# Patient Record
Sex: MALE | Race: BLACK OR AFRICAN AMERICAN | HISPANIC OR LATINO | URBAN - METROPOLITAN AREA
[De-identification: names, ages, dates, MRNs, and addresses within clinical notes are randomized per-mention and may not be internally consistent; named-entity substitution may affect disease eponyms.]

---

## 2023-06-19 ENCOUNTER — HOSPITAL ENCOUNTER (EMERGENCY)
Facility: HOSPITAL | Age: 28
Discharge: HOME | End: 2023-06-19
Attending: INTERNAL MEDICINE

## 2023-06-19 ENCOUNTER — APPOINTMENT (EMERGENCY)
Dept: RADIOLOGY | Facility: HOSPITAL | Age: 28
End: 2023-06-19

## 2023-06-19 VITALS
HEART RATE: 90 BPM | RESPIRATION RATE: 16 BRPM | SYSTOLIC BLOOD PRESSURE: 121 MMHG | DIASTOLIC BLOOD PRESSURE: 79 MMHG | TEMPERATURE: 98.6 F | OXYGEN SATURATION: 99 % | WEIGHT: 113 LBS

## 2023-06-19 DIAGNOSIS — R68.89 FLU-LIKE SYMPTOMS: Primary | ICD-10-CM

## 2023-06-19 LAB
S PYO DNA THROAT QL NAA+PROBE: NOT DETECTED
SARS-COV-2 RNA RESP QL NAA+PROBE: NEGATIVE

## 2023-06-19 PROCEDURE — 87635 SARS-COV-2 COVID-19 AMP PRB: CPT | Performed by: PHYSICIAN ASSISTANT

## 2023-06-19 PROCEDURE — 71045 X-RAY EXAM CHEST 1 VIEW: CPT

## 2023-06-19 PROCEDURE — 87651 STREP A DNA AMP PROBE: CPT | Performed by: PHYSICIAN ASSISTANT

## 2023-06-19 PROCEDURE — 99283 EMERGENCY DEPT VISIT LOW MDM: CPT

## 2023-06-19 RX ORDER — IBUPROFEN 400 MG/1
400 TABLET ORAL ONCE
Status: COMPLETED | OUTPATIENT
Start: 2023-06-19 | End: 2023-06-19

## 2023-06-19 RX ORDER — DEXTROMETHORPHAN HYDROBROMIDE AND PROMETHAZINE HYDROCHLORIDE 15; 6.25 MG/5ML; MG/5ML
5 SOLUTION ORAL 4 TIMES DAILY PRN
Qty: 118 ML | Refills: 0 | Status: SHIPPED | OUTPATIENT
Start: 2023-06-19 | End: 2023-06-26

## 2023-06-19 RX ADMIN — IBUPROFEN 400 MG: 400 TABLET, FILM COATED ORAL at 15:11

## 2023-06-19 NOTE — ED PROVIDER NOTES
"History  Chief Complaint   Patient presents with   • Flu Symptoms     Reports cough, congestion and overall \"not feeling well\" since yesterday  This is a 28-year-old male patient who presents with a cough congestion body aches not feeling well  States that yesterday when it started with a dry cough he is now making mucus  He has no shortness of breath or chest pain  Denies any fever or chills  Has a generalized headache no focal findings no blurred vision double vision no photophobia no stiff neck does have a sore throat when he coughs no nasal congestion no nausea vomiting diarrhea abdominal pain no urinary symptoms  Nothing makes it better or worse he has tried nothing over-the-counter differential diagnosis includes not limited to COVID, flu, pneumonia, viral syndrome          None       History reviewed  No pertinent past medical history  History reviewed  No pertinent surgical history  History reviewed  No pertinent family history  I have reviewed and agree with the history as documented  E-Cigarette/Vaping     E-Cigarette/Vaping Substances     Social History     Tobacco Use   • Smoking status: Never   • Smokeless tobacco: Never   Substance Use Topics   • Alcohol use: Not Currently   • Drug use: Yes     Types: Marijuana       Review of Systems   Constitutional: Negative for chills, diaphoresis, fatigue and fever  HENT: Positive for sore throat  Negative for congestion, ear pain, nosebleeds and voice change  Eyes: Negative for photophobia, pain, discharge and visual disturbance  Respiratory: Positive for cough  Negative for choking, chest tightness, shortness of breath and wheezing  Cardiovascular: Negative for chest pain and palpitations  Gastrointestinal: Negative for abdominal distention, abdominal pain, diarrhea and vomiting  Genitourinary: Negative for dysuria, flank pain and frequency  Musculoskeletal: Positive for myalgias   Negative for back pain, gait problem and joint " swelling  Skin: Negative for color change and rash  Neurological: Positive for headaches  Negative for dizziness and syncope  Psychiatric/Behavioral: Negative for behavioral problems and confusion  The patient is not nervous/anxious  All other systems reviewed and are negative  Physical Exam  Physical Exam  Vitals and nursing note reviewed  Constitutional:       General: He is not in acute distress  Appearance: Normal appearance  He is well-developed  He is not ill-appearing, toxic-appearing or diaphoretic  HENT:      Head: Normocephalic and atraumatic  Right Ear: Tympanic membrane, ear canal and external ear normal       Left Ear: Tympanic membrane, ear canal and external ear normal       Nose: Nose normal       Mouth/Throat:      Mouth: Mucous membranes are moist    Eyes:      Extraocular Movements: Extraocular movements intact  Conjunctiva/sclera: Conjunctivae normal       Pupils: Pupils are equal, round, and reactive to light  Cardiovascular:      Rate and Rhythm: Normal rate and regular rhythm  Heart sounds: No murmur heard  Pulmonary:      Effort: Pulmonary effort is normal  No respiratory distress  Breath sounds: Normal breath sounds  Abdominal:      Palpations: Abdomen is soft  Tenderness: There is no abdominal tenderness  Musculoskeletal:         General: No swelling  Cervical back: Neck supple  Skin:     General: Skin is warm and dry  Capillary Refill: Capillary refill takes less than 2 seconds  Neurological:      General: No focal deficit present  Mental Status: He is alert and oriented to person, place, and time     Psychiatric:         Mood and Affect: Mood normal          Vital Signs  ED Triage Vitals   Temperature Pulse Respirations Blood Pressure SpO2   06/19/23 1447 06/19/23 1446 06/19/23 1446 06/19/23 1446 06/19/23 1446   98 6 °F (37 °C) 90 16 121/79 99 %      Temp Source Heart Rate Source Patient Position - Orthostatic VS BP Location FiO2 (%)   06/19/23 1447 -- 06/19/23 1446 06/19/23 1446 --   Oral  Lying Right arm       Pain Score       06/19/23 1450       7           Vitals:    06/19/23 1446   BP: 121/79   Pulse: 90   Patient Position - Orthostatic VS: Lying         Visual Acuity      ED Medications  Medications   ibuprofen (MOTRIN) tablet 400 mg (400 mg Oral Given 6/19/23 1511)       Diagnostic Studies  Results Reviewed     Procedure Component Value Units Date/Time    COVID only [[id number]]  (Normal) Collected: 06/19/23 1513    Lab Status: Final result Specimen: Nares from Nose Updated: 06/19/23 1556     SARS-CoV-2 Negative    Narrative:      FOR PEDIATRIC PATIENTS - copy/paste COVID Guidelines URL to browser: [email or web address]  [employer]    SARS-CoV-2 assay is a Nucleic Acid Amplification assay intended for the  qualitative detection of nucleic acid from SARS-CoV-2 in nasopharyngeal  swabs  Results are for the presumptive identification of SARS-CoV-2 RNA  Positive results are indicative of infection with SARS-CoV-2, the virus  causing COVID-19, but do not rule out bacterial infection or co-infection  with other viruses  Laboratories within the [place] and its  territories are required to report all positive results to the appropriate  public health authorities  Negative results do not preclude SARS-CoV-2  infection and should not be used as the sole basis for treatment or other  patient management decisions  Negative results must be combined with  clinical observations, patient history, and epidemiological information  This test has not been FDA cleared or approved  This test has been authorized by FDA under an Emergency Use Authorization  (EUA)   This test is only authorized for the duration of time the  declaration that circumstances exist justifying the authorization of the  emergency use of an in vitro diagnostic tests for detection of SARS-CoV-2  virus and/or diagnosis of COVID-19 infection under section 564(b)(1) of  the Act, 21 U  S C  140JMB-8(S)(6), unless the authorization is terminated  or revoked sooner  The test has been validated but independent review by FDA  and CLIA is pending  Test performed using [employer] GeneXpert: This RT-PCR assay targets N2,  a region unique to SARS-CoV-2  A conserved region in the E-gene was chosen  for pan-Sarbecovirus detection which includes SARS-CoV-2  According to CMS-2020-01-R, this platform meets the definition of [email or web address]-[email or web address] technology  Strep A PCR [[id number]]  (Normal) Collected: 06/19/23 1513    Lab Status: Final result Specimen: Throat Updated: 06/19/23 1549     STREP A PCR Not Detected                 XR chest 1 view portable   ED Interpretation by Heriberto Spring PA-C (06/19 1520)   nad                 Procedures  Procedures         ED Course                               SBIRT 20yo+    Flowsheet Row Most Recent Value   Initial Alcohol Screen: US AUDIT-C     1  How often do you have a drink containing alcohol? 0 Filed at: 06/19/2023 1447   2  How many drinks containing alcohol do you have on a typical day you are drinking? 0 Filed at: 06/19/2023 1447   3a  Male UNDER 65: How often do you have five or more drinks on one occasion? 0 Filed at: 06/19/2023 1447   3b  FEMALE Any Age, or MALE 65+: How often do you have 4 or more drinks on one occassion? 0 Filed at: 06/19/2023 1447   Audit-C Score 0 Filed at: 06/19/2023 1447   KAT: How many times in the past year have you    Used an illegal drug or used a prescription medication for non-medical reasons? Never Filed at: 06/19/2023 1447                    Medical Decision Making  57-year-old male patient who presents with flulike symptoms with a cough since yesterday which turned into a little productive today  Generalized nonfocal headache no stiff neck nontoxic no acute distress no congestion mild sore throat when he coughs    No chest pain or shortness of breath nausea vomiting diarrhea abdominal pain  Nothing makes it better or worse tried nothing over-the-counter differential diagnosis includes not limited to COVID, influenza, pneumonia, strep, viral syndrome    Flu-like symptoms: acute illness or injury     Details: Patient has no respiratory distress or any exam findings  A COVID and strep test was taken x-ray was ordered  Amount and/or Complexity of Data Reviewed  Labs: ordered  Details: COVID and strep test pending  Radiology: ordered and independent interpretation performed  Details: I personally interpreted the chest x-ray is no acute finding  Discussion of management or test interpretation with external provider(s): Using joint decision-making with patient I advised him that we will call him if his strep or COVID test is positive I explained that his chest x-ray was negative he was prescribed Promethazine DM told to return worsening symptoms follow-up with the family doctor he verbalized understanding    Risk  Prescription drug management  Disposition  Final diagnoses:   Flu-like symptoms     Time reflects when diagnosis was documented in both MDM as applicable and the Disposition within this note     Time User Action Codes Description Comment    6/19/2023  3:21 PM Sherlene Phlegm Add [R68 89] Flu-like symptoms       ED Disposition     ED Disposition   Discharge    Condition   Stable    Date/Time   Mon Jun 19, 2023  3:21 PM    Comment   Naheed Monaco discharge to home/self care                 Follow-up Information     Follow up With Specialties Details Why Contact Info [facility] Schedule an appointment as soon as possible for a visit   [address] [id number]  [id number]  , [place], [place], [address]          Discharge Medication List as of 6/19/2023  3:22 PM      START taking these medications Details   Promethazine-DM (PHENERGAN-DM) 6 25-15 mg/5 mL oral syrup Take 5 mL by mouth 4 (four) times a day as needed for cough for up to 7 days, Starting Mon 6/19/2023, Until Mon 6/26/2023 at 2359, Normal             No discharge procedures on file      PDMP Review     None          ED Provider  Electronically Signed by           Leslye Adamson PA-C  06/19/23 9619

## 2023-06-19 NOTE — Clinical Note
Chelsea Lee was seen and treated in our emergency department on 6/19/2023  Diagnosis: flu like symptoms    Joel    He may return on this date: 06/21/2023         If you have any questions or concerns, please don't hesitate to call        Jemal Villegas PA-C    ______________________________           _______________          _______________  Hospital Representative                              Date                                Time